# Patient Record
(demographics unavailable — no encounter records)

---

## 2024-10-14 NOTE — HISTORY OF PRESENT ILLNESS
[FreeTextEntry1] : This 22-year-old female is here for a well woman examination.  She also is concerned about her intrauterine device and whether it is in the proper place.  She does have regular menstrual cycles on a monthly basis and no abnormal symptomatology.

## 2024-10-14 NOTE — DISCUSSION/SUMMARY
[FreeTextEntry1] : This was a well woman examination in this 22-year-old female  0.  Her intrauterine device appears to be in place with the string noticed on intact examination and the proper length approximately 2 cm.  A Pap test was taken.  The patient was concerned about potential STDs.  She will otherwise return to this office as needed or in 1 year for another well woman or annual exam.  The patient agreed with the recommendation and the plan.